# Patient Record
(demographics unavailable — no encounter records)

---

## 2025-03-26 NOTE — HISTORY OF PRESENT ILLNESS
[FreeTextEntry1] : 3/19/25 -    2/9/21 - Patient reports need to deep breaths at rest.  Patient denies CP.  Patient denies CP.  Patient denies palpitations.  Patient wants to know whether she should continue Metoprolol ER 25 mg.  I advised patient to continue Metoprolol ER 25 mg to help control her BP since she has LVH on echo and hypertensive response during stress testing.  1/11/21 - Patient reports 2-3 episodes of substernal CP in the last 2 days lasting seconds. Patient denies SOB. Patient denies lightheadedness. She feels some anxiousness. I advised patient to undergo an echocardiogram and a treadmill stress test.  Patient underwent an echocardiogram and it showed normal LV function, mild LVH, with mild AR.  Patient underwent a treadmill stress test and completed 7 minutes of Edil protocol.  There were upsloping ST depressions on ECG but no symptoms.  Following treadmill stress, there was no echocardiographic evidence of ischemia.  Hypertensive response noted.   5/28/19 -  Patient was told she has LV enlargement. Patient denies CP, SOB, palpitations, or lightheadedness.  She feels the need to take deep breaths occasionally. Patient reports that she does not exercise. I advised patient to undergo an echocardiogram.

## 2025-03-26 NOTE — REASON FOR VISIT
[FreeTextEntry1] : 70 year-old female with no significant PMH presents for followup.    Patient was last seen on 1/11/21 for CP.  Patient underwent an echocardiogram and it showed normal LV function without significant valvular pathology. Patient underwent a treadmill stress test and completed 7 minutes of Edil protocol.  There were upsloping ST depressions on ECG but no symptoms.  Following treadmill stress, there was no echocardiographic evidence of ischemia.  Hypertensive response noted.  Patient wore a Holter and it showed occasional APC's without significant arrhythmia.  I advised patient to start Metoprolol ER 25 mg.  2/9/21 - Patient reports need to deep breaths at rest.  Patient denies CP.  Patient denies CP.  Patient denies palpitations.  Patient wants to know whether she should continue Metoprolol ER 25 mg.  I advised patient to continue Metoprolol ER 25 mg to help control her BP since she has LVH on echo and hypertensive response during stress testing. 70 year-old female with no significant PMH presents for followup.    Patient was last seen on 1/11/21 for CP.  2/9/21 - Patient reports need to deep breaths at rest.  Patient denies CP.  Patient denies CP.  Patient denies palpitations.  Patient wants to know whether she should continue Metoprolol ER 25 mg.  I advised patient to continue Metoprolol ER 25 mg to help control her BP since she has LVH on echo and hypertensive response during stress testing.  Patient underwent an echocardiogram 1/11/21 and it showed normal LV function without significant valvular pathology.   Patient underwent a treadmill stress test 1/11/21 and completed 7 minutes of Edil protocol.  There were upsloping ST depressions on ECG but no symptoms.  Following treadmill stress, there was no echocardiographic evidence of ischemia.  Hypertensive response noted.    Patient wore a Holter 1/11/21 and it showed occasional APC's without significant arrhythmia.  I advised patient to start Metoprolol ER 25 mg.